# Patient Record
Sex: FEMALE | Race: WHITE | NOT HISPANIC OR LATINO | URBAN - METROPOLITAN AREA
[De-identification: names, ages, dates, MRNs, and addresses within clinical notes are randomized per-mention and may not be internally consistent; named-entity substitution may affect disease eponyms.]

---

## 2020-10-01 ENCOUNTER — OFFICE VISIT (OUTPATIENT)
Dept: URBAN - METROPOLITAN AREA CLINIC 121 | Facility: CLINIC | Age: 60
End: 2020-10-01

## 2021-09-21 ENCOUNTER — OFFICE VISIT (OUTPATIENT)
Dept: URBAN - METROPOLITAN AREA CLINIC 63 | Facility: CLINIC | Age: 61
End: 2021-09-21

## 2021-11-29 ENCOUNTER — OFFICE VISIT (OUTPATIENT)
Dept: URBAN - METROPOLITAN AREA SURGERY CENTER 4 | Facility: SURGERY CENTER | Age: 61
End: 2021-11-29

## 2021-11-30 LAB — PATHOLOGY (INDENTED REPORT): (no result)

## 2021-12-13 ENCOUNTER — OFFICE VISIT (OUTPATIENT)
Dept: URBAN - METROPOLITAN AREA CLINIC 63 | Facility: CLINIC | Age: 61
End: 2021-12-13

## 2022-07-09 ENCOUNTER — TELEPHONE ENCOUNTER (OUTPATIENT)
Dept: URBAN - METROPOLITAN AREA CLINIC 121 | Facility: CLINIC | Age: 62
End: 2022-07-09

## 2022-07-09 RX ORDER — LEVOTHYROXINE SODIUM 200 UG/1
TABLET ORAL TAKE AS DIRECTED
Refills: 0 | OUTPATIENT
Start: 2013-05-15 | End: 2013-06-25

## 2022-07-09 RX ORDER — LEVOTHYROXINE SODIUM 0.17 MG/1
TABLET ORAL
Refills: 0 | OUTPATIENT
Start: 2021-08-31 | End: 2021-09-21

## 2022-07-09 RX ORDER — ESCITALOPRAM 10 MG/1
TABLET, FILM COATED ORAL
Refills: 0 | OUTPATIENT
Start: 2021-08-31 | End: 2021-09-21

## 2022-07-09 RX ORDER — LEVOTHYROXINE SODIUM 200 UG/1
TABLET ORAL TAKE AS DIRECTED
Refills: 0 | OUTPATIENT
Start: 2013-06-25 | End: 2021-09-21

## 2022-07-10 ENCOUNTER — TELEPHONE ENCOUNTER (OUTPATIENT)
Dept: URBAN - METROPOLITAN AREA CLINIC 121 | Facility: CLINIC | Age: 62
End: 2022-07-10

## 2022-07-10 RX ORDER — OMEPRAZOLE 40 MG/1
ONCE A DAY CAPSULE, DELAYED RELEASE ORAL ONCE A DAY
Refills: 1 | Status: ACTIVE | COMMUNITY
Start: 2021-09-21

## 2022-07-10 RX ORDER — ESCITALOPRAM 10 MG/1
TABLET, FILM COATED ORAL AS NEEDED
Refills: 0 | Status: ACTIVE | COMMUNITY
Start: 2021-09-21

## 2022-07-10 RX ORDER — POLYETHYLENE GLYCOL 3350, SODIUM SULFATE, SODIUM CHLORIDE, POTASSIUM CHLORIDE, ASCORBIC ACID, SODIUM ASCORBATE 7.5-2.691G
KIT ORAL TAKE AS DIRECTED
Refills: 0 | Status: ACTIVE | COMMUNITY
Start: 2013-05-15

## 2022-07-10 RX ORDER — LEVOTHYROXINE SODIUM 0.17 MG/1
TABLET ORAL ONCE A DAY
Refills: 0 | Status: ACTIVE | COMMUNITY
Start: 2021-09-21

## 2023-12-14 ENCOUNTER — P2P PATIENT RECORD (OUTPATIENT)
Age: 63
End: 2023-12-14

## 2023-12-14 ENCOUNTER — TELEPHONE ENCOUNTER (OUTPATIENT)
Dept: URBAN - METROPOLITAN AREA CLINIC 64 | Facility: CLINIC | Age: 63
End: 2023-12-14

## 2024-02-15 ENCOUNTER — OV CON (OUTPATIENT)
Dept: URBAN - METROPOLITAN AREA CLINIC 63 | Facility: CLINIC | Age: 64
End: 2024-02-15
Payer: MEDICARE

## 2024-02-15 VITALS
RESPIRATION RATE: 20 BRPM | DIASTOLIC BLOOD PRESSURE: 82 MMHG | SYSTOLIC BLOOD PRESSURE: 115 MMHG | OXYGEN SATURATION: 94 % | HEART RATE: 75 BPM | TEMPERATURE: 98.1 F | BODY MASS INDEX: 27.01 KG/M2 | WEIGHT: 178.2 LBS | HEIGHT: 68 IN

## 2024-02-15 DIAGNOSIS — K21.9 GERD WITHOUT ESOPHAGITIS: ICD-10-CM

## 2024-02-15 DIAGNOSIS — Z12.11 COLON CANCER SCREENING: ICD-10-CM

## 2024-02-15 DIAGNOSIS — Z98.84 S/P LAPAROSCOPIC SLEEVE GASTRECTOMY: ICD-10-CM

## 2024-02-15 DIAGNOSIS — E66.3 OVERWEIGHT: ICD-10-CM

## 2024-02-15 PROBLEM — 305058001: Status: ACTIVE | Noted: 2024-02-15

## 2024-02-15 PROBLEM — 329281000119107: Status: ACTIVE | Noted: 2024-02-15

## 2024-02-15 PROCEDURE — 99203 OFFICE O/P NEW LOW 30 MIN: CPT | Performed by: INTERNAL MEDICINE

## 2024-02-15 RX ORDER — ONDANSETRON HYDROCHLORIDE 4 MG/1
1 TABLET TABLET, FILM COATED ORAL
Qty: 2 | Refills: 0 | OUTPATIENT
Start: 2024-02-15

## 2024-02-15 RX ORDER — POLYETHYLENE GLYCOL 3350, SODIUM CHLORIDE, SODIUM BICARBONATE, POTASSIUM CHLORIDE 420; 11.2; 5.72; 1.48 G/4L; G/4L; G/4L; G/4L
AS DIRECTED POWDER, FOR SOLUTION ORAL ONCE
Qty: 4000 | Refills: 0 | OUTPATIENT
Start: 2024-02-15 | End: 2024-02-16

## 2024-02-15 RX ORDER — LEVOTHYROXINE SODIUM 150 UG/1
1 CAPSULE IN THE MORNING ON AN EMPTY STOMACH CAPSULE ORAL ONCE A DAY
Refills: 0 | Status: ACTIVE | COMMUNITY
Start: 2021-09-21

## 2024-02-15 NOTE — HPI-PREVIOUS LABS
Labs January 2024: TSH 0.120 (L), free T4. 1.71 WBC 5.4, hemoglobin 13.7 g MCV 94, platelets 310, normal renal function, normal electrolytes, serum albumin 4.5, normal liver enzymes, normal urinalysis, Total cholesterol 220, triglycerides 104,  (H), hemoglobin A1c 5.6,

## 2024-02-15 NOTE — HPI-TODAY'S VISIT:
Hortencia is a pleasant 63-year-old female with history of sleeve gastrectomy who is here for a screening colonoscopy. Her last colonoscopy was done 10 years ago. Hyperplastic rectal polyp. She is asymptomatic from a GI standpoint. Denies any symptoms of reflux or dysphagia early satiety or bloating or abdominal pain or nausea. She takes fiber Gummies x 2 every night and has regular bowel movements. Reports no rectal bleeding or melena. Denies any unintentional weight loss loss of appetite. No reports of any anemia.

## 2024-02-15 NOTE — HPI-PREVIOUS PROCEDURES
Upper endoscopy Dr. Peterson November 2021: Normal esophagus with unremarkable biopsies, 2 cm hiatal hernia, normal stomach, sleeve gastrectomy, normal duodenum with unremarkable biopsies Colonoscopy Dr. Peterson 2013: Procedure performed without difficulty, good prep, diminutive polyp in the cecum with unremarkable pathology, diminutive polyp in the rectum-hyperplastic polyp, recall 10 years

## 2024-02-22 ENCOUNTER — LAB (OUTPATIENT)
Dept: URBAN - METROPOLITAN AREA CLINIC 63 | Facility: CLINIC | Age: 64
End: 2024-02-22

## 2024-03-18 ENCOUNTER — COLON (OUTPATIENT)
Dept: URBAN - METROPOLITAN AREA SURGERY CENTER 4 | Facility: SURGERY CENTER | Age: 64
End: 2024-03-18

## 2024-03-25 ENCOUNTER — COLON (OUTPATIENT)
Dept: URBAN - METROPOLITAN AREA SURGERY CENTER 4 | Facility: SURGERY CENTER | Age: 64
End: 2024-03-25
Payer: MEDICARE

## 2024-03-25 ENCOUNTER — LAB (OUTPATIENT)
Dept: URBAN - METROPOLITAN AREA CLINIC 4 | Facility: CLINIC | Age: 64
End: 2024-03-25
Payer: MEDICARE

## 2024-03-25 DIAGNOSIS — K62.1 RECTAL POLYP: ICD-10-CM

## 2024-03-25 DIAGNOSIS — K64.1 SECOND DEGREE HEMORRHOIDS: ICD-10-CM

## 2024-03-25 DIAGNOSIS — Z86.010 PERSONAL HISTORY OF COLONIC POLYPS: ICD-10-CM

## 2024-03-25 DIAGNOSIS — K63.5 POLYP OF TRANSVERSE COLON, UNSPECIFIED TYPE: ICD-10-CM

## 2024-03-25 DIAGNOSIS — K63.89 OTHER SPECIFIED DISEASES OF INTESTINE: ICD-10-CM

## 2024-03-25 DIAGNOSIS — K57.30 DIVERTICULOSIS OF LARGE INTESTINE WITHOUT PERFORATION OR ABSCESS WITHOUT BLEEDING: ICD-10-CM

## 2024-03-25 PROCEDURE — 45380 COLONOSCOPY AND BIOPSY: CPT | Performed by: INTERNAL MEDICINE

## 2024-03-25 PROCEDURE — 88305 TISSUE EXAM BY PATHOLOGIST: CPT | Performed by: PATHOLOGY

## 2024-03-25 PROCEDURE — 45385 COLONOSCOPY W/LESION REMOVAL: CPT | Performed by: INTERNAL MEDICINE

## 2024-03-25 RX ORDER — LEVOTHYROXINE SODIUM 150 UG/1
1 CAPSULE IN THE MORNING ON AN EMPTY STOMACH CAPSULE ORAL ONCE A DAY
Refills: 0 | Status: ACTIVE | COMMUNITY
Start: 2021-09-21

## 2024-03-25 RX ORDER — ONDANSETRON HYDROCHLORIDE 4 MG/1
1 TABLET TABLET, FILM COATED ORAL
Qty: 2 | Refills: 0 | Status: ACTIVE | COMMUNITY
Start: 2024-02-15

## 2024-04-03 ENCOUNTER — OV EP (OUTPATIENT)
Dept: URBAN - METROPOLITAN AREA CLINIC 63 | Facility: CLINIC | Age: 64
End: 2024-04-03

## 2024-04-11 ENCOUNTER — OV EP (OUTPATIENT)
Dept: URBAN - METROPOLITAN AREA CLINIC 63 | Facility: CLINIC | Age: 64
End: 2024-04-11
Payer: MEDICARE

## 2024-04-11 VITALS
BODY MASS INDEX: 27.01 KG/M2 | HEART RATE: 80 BPM | HEIGHT: 68 IN | TEMPERATURE: 96.7 F | SYSTOLIC BLOOD PRESSURE: 110 MMHG | DIASTOLIC BLOOD PRESSURE: 62 MMHG | WEIGHT: 178.2 LBS | OXYGEN SATURATION: 96 %

## 2024-04-11 DIAGNOSIS — Z12.11 COLON CANCER SCREENING: ICD-10-CM

## 2024-04-11 DIAGNOSIS — Z86.010 HISTORY OF ADENOMATOUS POLYP OF COLON: ICD-10-CM

## 2024-04-11 PROBLEM — 429047008: Status: ACTIVE | Noted: 2024-04-11

## 2024-04-11 PROCEDURE — 99212 OFFICE O/P EST SF 10 MIN: CPT

## 2024-04-11 RX ORDER — LEVOTHYROXINE SODIUM 150 UG/1
1 CAPSULE IN THE MORNING ON AN EMPTY STOMACH CAPSULE ORAL ONCE A DAY
Refills: 0 | Status: ACTIVE | COMMUNITY
Start: 2021-09-21

## 2024-04-11 NOTE — HPI-PREVIOUS PROCEDURES
Colonoscopy 3/25/2024 consistent with nonthrombosed external and internal hemorrhoids (grade 2) 12 mm polyp at hepatic flexure consistent with sessile serrated adenoma 4 mm polyp in rectum consistent with hyperplastic polyp Diverticulosis in rectosigmoid colon and sigmoid colon Recall for 3 years. Upper endoscopy Dr. Peterson November 2021: Normal esophagus with unremarkable biopsies, 2 cm hiatal hernia, normal stomach, sleeve gastrectomy, normal duodenum with unremarkable biopsies Colonoscopy Dr. Peterson 2013: Procedure performed without difficulty, good prep, diminutive polyp in the cecum with unremarkable pathology, diminutive polyp in the rectum-hyperplastic polyp, recall 10 years

## 2024-04-11 NOTE — HPI-ZZZTODAY'S VISIT
Hortencia is a very pleasant 63-year-old female who presents for follow-up.  She is a patient of Dr. Tran.  Last seen in office on 2/15/2024.  Past medical history significant for sleeve gastrectomy, GERD, depression, breast cancer, vitamin D deficiency, hypothyroidism, osteopenia.  Past surgical history significant for sleeve gastrectomy, hysterectomy, appendectomy, rotator cuff surgery.  Family history noncontributory. Hortencia remains asymptomatic from a GI perspective. She tolerated the procedures well. She takes Metamucil gummies with great efficacy for consistent bowel movements. She denies dysphagia, dyspepsia, pyrosis, abdominal pain, change in bowel habits, unintentional weight loss, melena, or hematochezia.

## 2024-08-21 ENCOUNTER — TELEPHONE ENCOUNTER (OUTPATIENT)
Dept: URBAN - METROPOLITAN AREA CLINIC 64 | Facility: CLINIC | Age: 64
End: 2024-08-21

## 2024-08-21 ENCOUNTER — P2P PATIENT RECORD (OUTPATIENT)
Age: 64
End: 2024-08-21

## 2024-09-05 ENCOUNTER — OFFICE VISIT (OUTPATIENT)
Dept: URBAN - METROPOLITAN AREA CLINIC 63 | Facility: CLINIC | Age: 64
End: 2024-09-05
Payer: MEDICARE

## 2024-09-05 ENCOUNTER — DASHBOARD ENCOUNTERS (OUTPATIENT)
Age: 64
End: 2024-09-05

## 2024-09-05 VITALS
BODY MASS INDEX: 27.89 KG/M2 | HEART RATE: 76 BPM | SYSTOLIC BLOOD PRESSURE: 108 MMHG | WEIGHT: 184 LBS | OXYGEN SATURATION: 97 % | HEIGHT: 68 IN | TEMPERATURE: 97.9 F | DIASTOLIC BLOOD PRESSURE: 60 MMHG

## 2024-09-05 DIAGNOSIS — R10.13 EPIGASTRIC DISCOMFORT: ICD-10-CM

## 2024-09-05 PROCEDURE — 99213 OFFICE O/P EST LOW 20 MIN: CPT

## 2024-09-05 RX ORDER — LEVOTHYROXINE SODIUM 150 UG/1
1 CAPSULE IN THE MORNING ON AN EMPTY STOMACH CAPSULE ORAL ONCE A DAY
Refills: 0 | Status: ACTIVE | COMMUNITY
Start: 2021-09-21

## 2024-09-05 RX ORDER — ATORVASTATIN CALCIUM 10 MG/1
TABLET, FILM COATED ORAL
Qty: 90 TABLET | Status: ACTIVE | COMMUNITY

## 2024-09-05 RX ORDER — OMEPRAZOLE 40 MG/1
TAKE 1 CAPSULE BY MOUTH EVERY DAY CAPSULE, DELAYED RELEASE ORAL
Qty: 90 EACH | Refills: 0 | Status: ACTIVE | COMMUNITY

## 2024-09-05 RX ORDER — LEVOTHYROXINE SODIUM 0.15 MG/1
TABLET ORAL
Qty: 90 TABLET | Status: ACTIVE | COMMUNITY

## 2024-09-05 RX ORDER — TRIAMCINOLONE ACETONIDE 1 MG/G
CREAM TOPICAL
Qty: 454 GRAM | Status: ACTIVE | COMMUNITY

## 2024-09-05 RX ORDER — HYDROXYZINE HYDROCHLORIDE 10 MG/1
TAKE 1 TABLET BY MOUTH EVERY DAY AT BEDTIME TABLET, FILM COATED ORAL
Qty: 90 EACH | Refills: 0 | Status: ACTIVE | COMMUNITY

## 2024-09-05 RX ORDER — FLUCONAZOLE 150 MG/1
TABLET ORAL
Qty: 1 TABLET | Status: ACTIVE | COMMUNITY

## 2024-09-05 RX ORDER — PREDNISONE 10 MG/1
TABLET ORAL
Qty: 15 TABLET | Status: ACTIVE | COMMUNITY

## 2024-09-05 RX ORDER — HALOBETASOL PROPIONATE 0.5 MG/G
APPLY TOPICALLY TO THE AFFECTED AREA EVERY NIGHT AT BEDTIME CREAM TOPICAL
Qty: 50 GRAM | Refills: 1 | Status: ACTIVE | COMMUNITY

## 2024-09-05 NOTE — HPI-PREVIOUS IMAGING
2/1/2022 CT abdomen/pelvis with contrast - Patient is status post appendectomy with surrounding edema involving the pericecal region.  There is focal hypodense fluid collection measuring 2.5 by 2.1 x 2.5 cm adjacent to the region of the appendix which may represent postoperative seroma however cannot fully exclude a developing abscess.  Consider follow-up imaging for resolution versus WBC nuclear medicine imaging for signs of infection if clinically warranted. - Again seen is a large multilobular mass arising from the right ovary measuring up to 13 x 10 cm.  Smaller left ovarian lesion is seen with eccentric calcification and possible fat measuring approximate 3.5 x 2.5 cm.

## 2024-09-05 NOTE — HPI-PREVIOUS LABS
8/14/2024 labs - PT/INR within normal limits with exception of low PT of 11.8.. - CMP within normal limits with exception of elevated BUN of 22, elevated BUN/creatinine ratio of 37.3, elevated total protein of 8.3, elevated AST of 39. - CBC within normal limits.  Labs January 2024: TSH 0.120 (L), free T4. 1.71 WBC 5.4, hemoglobin 13.7 g MCV 94, platelets 310, normal renal function, normal electrolytes, serum albumin 4.5, normal liver enzymes, normal urinalysis, Total cholesterol 220, triglycerides 104,  (H), hemoglobin A1c 5.6,

## 2024-09-05 NOTE — HPI-ZZZTODAY'S VISIT
This is a very pleasant 64-year-old female who presents for complaint of epigastric pain and heartburn.  She is a patient of Dr. Tran. Past medical history significant for GERD, depression, breast cancer, vitamin D deficiency, hypothyroidism, osteopenia.  Past surgical history significant for sleeve gastrectomy, hysterectomy, appendectomy, rotator cuff surgery.  Family history noncontributory.  She presented to [facility] ER on 8/14/2024 for chief complaint of chest pain. She stated that she had woken up 2 nights ago with squeezing sensation in her chest rating up to her jaw on both sides.  EKG was normal and troponin was undetectable x 2. It was recommended she follow-up with GI for further evaluation.  Today, she presents in good general state. She has been taking omeprazole 40 mg once daily with good efficacy. Denies epigastric pain and heartburn at this time. She does also report that during gastric sleeve surgery in 2018, the surgeon mentioned that he saw a hematoma. She relates that the hematoma was small and the surgeon did not remove it. She does report that her father had a mass in his stomach, but is unsure of whether it was cancerous or not.  Currently has no GI complaints, questions, concerns. Denies melena, hematochezia, bright red blood per rectum, nausea/vomiting, hematemesis, dysphagia, reflux, abdominal pain, constipation/diarrhea/change in bowels, change in stool caliber, unintentional weight loss. Denies a history of stroke, heart attack, pacemaker, defibrillator, stents, blood thinners, COPD, asthma, sleep apnea, chronic kidney disease, diabetes, seizures.

## 2024-09-12 ENCOUNTER — LAB OUTSIDE AN ENCOUNTER (OUTPATIENT)
Dept: URBAN - METROPOLITAN AREA CLINIC 63 | Facility: CLINIC | Age: 64
End: 2024-09-12

## 2024-10-15 ENCOUNTER — OFFICE VISIT (OUTPATIENT)
Dept: URBAN - METROPOLITAN AREA CLINIC 63 | Facility: CLINIC | Age: 64
End: 2024-10-15

## 2024-12-05 ENCOUNTER — CLAIMS CREATED FROM THE CLAIM WINDOW (OUTPATIENT)
Dept: URBAN - METROPOLITAN AREA SURGERY CENTER 4 | Facility: SURGERY CENTER | Age: 64
End: 2024-12-05
Payer: MEDICARE

## 2024-12-05 ENCOUNTER — CLAIMS CREATED FROM THE CLAIM WINDOW (OUTPATIENT)
Dept: URBAN - METROPOLITAN AREA CLINIC 4 | Facility: CLINIC | Age: 64
End: 2024-12-05
Payer: MEDICARE

## 2024-12-05 DIAGNOSIS — K31.89 EROSIVE GASTROPATHY: ICD-10-CM

## 2024-12-05 DIAGNOSIS — Z80.0 FAMILY HISTORY OF MALIGNANT NEOPLASM OF DIGESTIVE ORGANS: ICD-10-CM

## 2024-12-05 DIAGNOSIS — K44.9 HIATAL HERNIA: ICD-10-CM

## 2024-12-05 DIAGNOSIS — Z90.3 HISTORY OF SLEEVE GASTRECTOMY: ICD-10-CM

## 2024-12-05 DIAGNOSIS — K31.89 OTHER DISEASES OF STOMACH AND DUODENUM: ICD-10-CM

## 2024-12-05 DIAGNOSIS — Z98.84 BARIATRIC SURGERY STATUS: ICD-10-CM

## 2024-12-05 DIAGNOSIS — K29.70 GASTRITIS, UNSPECIFIED, WITHOUT BLEEDING: ICD-10-CM

## 2024-12-05 PROCEDURE — 43239 EGD BIOPSY SINGLE/MULTIPLE: CPT | Performed by: CLINIC/CENTER

## 2024-12-05 PROCEDURE — 88312 SPECIAL STAINS GROUP 1: CPT | Performed by: PATHOLOGY

## 2024-12-05 PROCEDURE — 88305 TISSUE EXAM BY PATHOLOGIST: CPT | Performed by: PATHOLOGY

## 2024-12-05 PROCEDURE — 43239 EGD BIOPSY SINGLE/MULTIPLE: CPT | Performed by: INTERNAL MEDICINE

## 2024-12-05 PROCEDURE — 00731 ANES UPR GI NDSC PX NOS: CPT | Performed by: NURSE ANESTHETIST, CERTIFIED REGISTERED

## 2024-12-05 RX ORDER — PREDNISONE 10 MG/1
TABLET ORAL
Qty: 15 TABLET | Status: ACTIVE | COMMUNITY

## 2024-12-05 RX ORDER — TRIAMCINOLONE ACETONIDE 1 MG/G
CREAM TOPICAL
Qty: 454 GRAM | Status: ACTIVE | COMMUNITY

## 2024-12-05 RX ORDER — LEVOTHYROXINE SODIUM 150 UG/1
1 CAPSULE IN THE MORNING ON AN EMPTY STOMACH CAPSULE ORAL ONCE A DAY
Refills: 0 | Status: ACTIVE | COMMUNITY
Start: 2021-09-21

## 2024-12-05 RX ORDER — HALOBETASOL PROPIONATE 0.5 MG/G
APPLY TOPICALLY TO THE AFFECTED AREA EVERY NIGHT AT BEDTIME CREAM TOPICAL
Qty: 50 GRAM | Refills: 1 | Status: ACTIVE | COMMUNITY

## 2024-12-05 RX ORDER — LEVOTHYROXINE SODIUM 0.15 MG/1
TABLET ORAL
Qty: 90 TABLET | Status: ACTIVE | COMMUNITY

## 2024-12-05 RX ORDER — HYDROXYZINE HYDROCHLORIDE 10 MG/1
TAKE 1 TABLET BY MOUTH EVERY DAY AT BEDTIME TABLET, FILM COATED ORAL
Qty: 90 EACH | Refills: 0 | Status: ACTIVE | COMMUNITY

## 2024-12-05 RX ORDER — FLUCONAZOLE 150 MG/1
TABLET ORAL
Qty: 1 TABLET | Status: ACTIVE | COMMUNITY

## 2024-12-05 RX ORDER — OMEPRAZOLE 40 MG/1
TAKE 1 CAPSULE BY MOUTH EVERY DAY CAPSULE, DELAYED RELEASE ORAL
Qty: 90 EACH | Refills: 0 | Status: ACTIVE | COMMUNITY

## 2024-12-05 RX ORDER — ATORVASTATIN CALCIUM 10 MG/1
TABLET, FILM COATED ORAL
Qty: 90 TABLET | Status: ACTIVE | COMMUNITY

## 2024-12-19 ENCOUNTER — OFFICE VISIT (OUTPATIENT)
Dept: URBAN - METROPOLITAN AREA CLINIC 63 | Facility: CLINIC | Age: 64
End: 2024-12-19
Payer: MEDICARE

## 2024-12-19 VITALS
SYSTOLIC BLOOD PRESSURE: 112 MMHG | BODY MASS INDEX: 28.46 KG/M2 | HEART RATE: 71 BPM | WEIGHT: 187.8 LBS | DIASTOLIC BLOOD PRESSURE: 64 MMHG | TEMPERATURE: 97.4 F | OXYGEN SATURATION: 98 % | HEIGHT: 68 IN

## 2024-12-19 DIAGNOSIS — K31.89 EROSIVE GASTROPATHY: ICD-10-CM

## 2024-12-19 PROCEDURE — 99213 OFFICE O/P EST LOW 20 MIN: CPT

## 2024-12-19 RX ORDER — ATORVASTATIN CALCIUM 10 MG/1
TABLET, FILM COATED ORAL
Qty: 90 TABLET | Status: ACTIVE | COMMUNITY

## 2024-12-19 RX ORDER — OMEPRAZOLE 40 MG/1
TAKE 1 CAPSULE BY MOUTH EVERY DAY CAPSULE, DELAYED RELEASE ORAL
Qty: 90 EACH | Refills: 0 | Status: ACTIVE | COMMUNITY

## 2024-12-19 RX ORDER — HYDROXYZINE HYDROCHLORIDE 10 MG/1
TAKE 1 TABLET BY MOUTH EVERY DAY AT BEDTIME TABLET, FILM COATED ORAL
Qty: 90 EACH | Refills: 0 | Status: ACTIVE | COMMUNITY

## 2024-12-19 RX ORDER — LEVOTHYROXINE SODIUM 150 UG/1
1 CAPSULE IN THE MORNING ON AN EMPTY STOMACH CAPSULE ORAL ONCE A DAY
Refills: 0 | Status: ACTIVE | COMMUNITY
Start: 2021-09-21

## 2024-12-19 RX ORDER — OMEPRAZOLE 20 MG/1
TAKE 1 CAPSULE BY MOUTH EVERY DAY CAPSULE, DELAYED RELEASE ORAL ONCE A DAY
Qty: 90 | Refills: 1 | OUTPATIENT

## 2024-12-19 NOTE — HPI-PREVIOUS PROCEDURES
12/5/2024 EGD with Dr. Tran - Multiple biopsies taken the stomach using NBI and placed in the same bottle. - Normal esophagus. - 2 cm hiatal hernia. - A sleeve gastrectomy was found, characterized by congestion.  Biopsied. - Erosive gastropathy with no bleeding and no stigmata of recent bleeding.  Biopsied. - Normal examined duodenum. - Repeat upper endoscopy in 3 years for surveillance.  -  Pathology: Biopsy of body and antrum of stomach demonstrated chemical/reactive gastropathy.  Proton pump inhibitor effect.  No evidence of H. pylori or intestinal metaplasia.  Negative for dysplasia or malignancy.  Colonoscopy 3/25/2024 consistent with nonthrombosed external and internal hemorrhoids (grade 2) 12 mm polyp at hepatic flexure consistent with sessile serrated adenoma 4 mm polyp in rectum consistent with hyperplastic polyp Diverticulosis in rectosigmoid colon and sigmoid colon Recall for 3 years.   Upper endoscopy Dr. Peterson November 2021: Normal esophagus with unremarkable biopsies, 2 cm hiatal hernia, normal stomach, sleeve gastrectomy, normal duodenum with unremarkable biopsies  Colonoscopy Dr. Peterson 2013: Procedure performed without difficulty, good prep, diminutive polyp in the cecum with unremarkable pathology, diminutive polyp in the rectum-hyperplastic polyp, recall 10 years

## 2024-12-19 NOTE — HPI-PREVIOUS IMAGING
2/1/2022 CT abdomen/pelvis with contrast - Patient is status post appendectomy with surrounding edema involving the pericecal region.  There is focal hypodense fluid collection measuring 2.5 by 2.1 x 2.5 cm adjacent to the region of the appendix which may represent postoperative seroma however cannot fully exclude a developing abscess.  Consider follow-up imaging for resolution versus WBC nuclear medicine imaging for signs of infection if clinically warranted. - Again seen is a large multilobular mass arising from the right ovary measuring up to 13 x 10 cm.  Smaller left ovarian lesion is seen with eccentric calcification and possible fat measuring approximate 3.5 x 2.5 cm.
Never

## 2024-12-19 NOTE — HPI-ZZZTODAY'S VISIT
This is a very pleasant 64-year-old female who presents for follow-up of epigastric pain and heartburn.  She is a patient of Dr. Tran. Past medical history significant for GERD, depression, breast cancer, vitamin D deficiency, hypothyroidism, osteopenia.  Past surgical history significant for sleeve gastrectomy, hysterectomy, appendectomy, rotator cuff surgery.  Family history noncontributory. Last EGD on 12/5/2024 with Dr. Tran and last colonoscopy on 3/25/2024.  Recall EGD recommended in 3 years for surveillance and recall colonoscopy recommended in 3 years for surveillance.  At the previous visit, she reported that during gastric sleeve surgery in 2018, the surgeon mentioned that he saw a hematoma. She relates that the hematoma was small and the surgeon did not remove it. She does report that her father had a mass in his stomach, but is unsure of whether it was cancerous or not.  Today, she presents in good general state. She has been taking omeprazole 40 mg once daily with good efficacy. Denies epigastric pain and heartburn at this time. Discussed findings of EGD with patient.   Currently has no GI complaints, questions, concerns. Denies melena, hematochezia, bright red blood per rectum, nausea/vomiting, hematemesis, dysphagia, reflux, abdominal pain, constipation/diarrhea/change in bowels, change in stool caliber, unintentional weight loss.

## 2025-04-11 ENCOUNTER — OFFICE VISIT (OUTPATIENT)
Dept: URBAN - METROPOLITAN AREA CLINIC 63 | Facility: CLINIC | Age: 65
End: 2025-04-11